# Patient Record
Sex: FEMALE | Race: WHITE | NOT HISPANIC OR LATINO | Employment: UNEMPLOYED | URBAN - NONMETROPOLITAN AREA
[De-identification: names, ages, dates, MRNs, and addresses within clinical notes are randomized per-mention and may not be internally consistent; named-entity substitution may affect disease eponyms.]

---

## 2017-11-28 ENCOUNTER — LAB (OUTPATIENT)
Dept: LAB | Facility: HOSPITAL | Age: 27
End: 2017-11-28
Attending: OBSTETRICS & GYNECOLOGY

## 2017-11-28 ENCOUNTER — TRANSCRIBE ORDERS (OUTPATIENT)
Dept: ADMINISTRATIVE | Facility: HOSPITAL | Age: 27
End: 2017-11-28

## 2017-11-28 DIAGNOSIS — Z34.82 PRENATAL CARE, SUBSEQUENT PREGNANCY, SECOND TRIMESTER: ICD-10-CM

## 2017-11-28 DIAGNOSIS — Z34.82 PRENATAL CARE, SUBSEQUENT PREGNANCY, SECOND TRIMESTER: Primary | ICD-10-CM

## 2017-11-28 LAB
EXTERNAL CHLAMYDIA SCREEN: NEGATIVE
EXTERNAL GONORRHEA SCREEN: NEGATIVE

## 2018-03-06 ENCOUNTER — LAB (OUTPATIENT)
Dept: LAB | Facility: HOSPITAL | Age: 28
End: 2018-03-06
Attending: OBSTETRICS & GYNECOLOGY

## 2018-03-06 ENCOUNTER — TRANSCRIBE ORDERS (OUTPATIENT)
Dept: ADMINISTRATIVE | Facility: HOSPITAL | Age: 28
End: 2018-03-06

## 2018-03-06 DIAGNOSIS — Z34.82 PRENATAL CARE, SUBSEQUENT PREGNANCY, SECOND TRIMESTER: ICD-10-CM

## 2018-03-06 DIAGNOSIS — Z34.82 PRENATAL CARE, SUBSEQUENT PREGNANCY, SECOND TRIMESTER: Primary | ICD-10-CM

## 2018-03-06 LAB
ABO GROUP BLD: NORMAL
ALBUMIN SERPL-MCNC: 4 G/DL (ref 3.5–5)
ALBUMIN/GLOB SERPL: 1.2 G/DL (ref 1.5–2.5)
ALP SERPL-CCNC: 121 U/L (ref 35–104)
ALT SERPL W P-5'-P-CCNC: 79 U/L (ref 10–36)
ANION GAP SERPL CALCULATED.3IONS-SCNC: 8 MMOL/L (ref 3.6–11.2)
AST SERPL-CCNC: 57 U/L (ref 10–30)
BASOPHILS # BLD AUTO: 0.02 10*3/MM3 (ref 0–0.3)
BASOPHILS NFR BLD AUTO: 0.2 % (ref 0–2)
BILIRUB SERPL-MCNC: 0.5 MG/DL (ref 0.2–1.8)
BLD GP AB SCN SERPL QL: NEGATIVE
BUN BLD-MCNC: 7 MG/DL (ref 7–21)
BUN/CREAT SERPL: 12.3 (ref 7–25)
CALCIUM SPEC-SCNC: 9.1 MG/DL (ref 7.7–10)
CHLORIDE SERPL-SCNC: 102 MMOL/L (ref 99–112)
CO2 SERPL-SCNC: 23 MMOL/L (ref 24.3–31.9)
CREAT BLD-MCNC: 0.57 MG/DL (ref 0.43–1.29)
DEPRECATED RDW RBC AUTO: 41 FL (ref 37–54)
EOSINOPHIL # BLD AUTO: 0.04 10*3/MM3 (ref 0–0.7)
EOSINOPHIL NFR BLD AUTO: 0.3 % (ref 0–5)
ERYTHROCYTE [DISTWIDTH] IN BLOOD BY AUTOMATED COUNT: 12.8 % (ref 11.5–14.5)
EXTERNAL RUBELLA QUALITATIVE: NORMAL
EXTERNAL SYPHILIS RPR SCREEN: NORMAL
EXTERNAL VDRL: NORMAL
GFR SERPL CREATININE-BSD FRML MDRD: 127 ML/MIN/1.73
GLOBULIN UR ELPH-MCNC: 3.3 GM/DL
GLUCOSE BLD-MCNC: 94 MG/DL (ref 70–110)
HBV SURFACE AG SERPL QL IA: NORMAL
HCT VFR BLD AUTO: 34.8 % (ref 37–47)
HCV AB SER DONR QL: REACTIVE
HGB BLD-MCNC: 11.4 G/DL (ref 12–16)
IMM GRANULOCYTES # BLD: 0.07 10*3/MM3 (ref 0–0.03)
IMM GRANULOCYTES NFR BLD: 0.6 % (ref 0–0.5)
LYMPHOCYTES # BLD AUTO: 1.06 10*3/MM3 (ref 1–3)
LYMPHOCYTES NFR BLD AUTO: 8.8 % (ref 21–51)
MCH RBC QN AUTO: 29.8 PG (ref 27–33)
MCHC RBC AUTO-ENTMCNC: 32.8 G/DL (ref 33–37)
MCV RBC AUTO: 91.1 FL (ref 80–94)
MONOCYTES # BLD AUTO: 0.77 10*3/MM3 (ref 0.1–0.9)
MONOCYTES NFR BLD AUTO: 6.4 % (ref 0–10)
NEUTROPHILS # BLD AUTO: 10.14 10*3/MM3 (ref 1.4–6.5)
NEUTROPHILS NFR BLD AUTO: 83.7 % (ref 30–70)
OSMOLALITY SERPL CALC.SUM OF ELEC: 264.1 MOSM/KG (ref 273–305)
PLATELET # BLD AUTO: 194 10*3/MM3 (ref 130–400)
PMV BLD AUTO: 9.4 FL (ref 6–10)
POTASSIUM BLD-SCNC: 3.9 MMOL/L (ref 3.5–5.3)
PROT SERPL-MCNC: 7.3 G/DL (ref 6–8)
RBC # BLD AUTO: 3.82 10*6/MM3 (ref 4.2–5.4)
RH BLD: POSITIVE
SODIUM BLD-SCNC: 133 MMOL/L (ref 135–153)
WBC NRBC COR # BLD: 12.1 10*3/MM3 (ref 4.5–12.5)

## 2018-03-06 PROCEDURE — 36415 COLL VENOUS BLD VENIPUNCTURE: CPT

## 2018-03-06 PROCEDURE — 86765 RUBEOLA ANTIBODY: CPT

## 2018-03-06 PROCEDURE — 86735 MUMPS ANTIBODY: CPT

## 2018-03-06 PROCEDURE — 80053 COMPREHEN METABOLIC PANEL: CPT

## 2018-03-06 PROCEDURE — 86702 HIV-2 ANTIBODY: CPT

## 2018-03-06 PROCEDURE — 86701 HIV-1ANTIBODY: CPT

## 2018-03-06 PROCEDURE — 86803 HEPATITIS C AB TEST: CPT

## 2018-03-08 LAB
HIV 1 & 2 AB SERPLBLD IA.RAPID: NEGATIVE
HIV 2 AB SERPLBLD QL IA.RAPID: NEGATIVE
HIV1 AB SERPLBLD QL IA.RAPID: NEGATIVE
MEV IGG SER IA-ACNC: >300 AU/ML
MUV IGG SER IA-ACNC: 65.1 AU/ML
RPR SER QL: NON REACTIVE
RUBV IGG SERPL IA-ACNC: 3.01 INDEX

## 2018-05-02 LAB — EXTERNAL GROUP B STREP ANTIGEN: NEGATIVE

## 2018-05-08 ENCOUNTER — PREP FOR SURGERY (OUTPATIENT)
Dept: OTHER | Facility: HOSPITAL | Age: 28
End: 2018-05-08

## 2018-05-08 RX ORDER — SODIUM CHLORIDE 0.9 % (FLUSH) 0.9 %
1-10 SYRINGE (ML) INJECTION AS NEEDED
Status: CANCELLED | OUTPATIENT
Start: 2018-05-08

## 2018-05-08 RX ORDER — DIPHENHYDRAMINE HCL 25 MG
25 CAPSULE ORAL NIGHTLY PRN
Status: CANCELLED | OUTPATIENT
Start: 2018-05-08

## 2018-05-08 RX ORDER — OXYTOCIN/RINGER'S LACTATE 20/1000 ML
2 PLASTIC BAG, INJECTION (ML) INTRAVENOUS CONTINUOUS
Status: CANCELLED | OUTPATIENT
Start: 2018-05-08

## 2018-05-08 RX ORDER — MISOPROSTOL 100 UG/1
600 TABLET ORAL AS NEEDED
Status: CANCELLED | OUTPATIENT
Start: 2018-05-08

## 2018-05-08 RX ORDER — METOCLOPRAMIDE HYDROCHLORIDE 5 MG/ML
10 INJECTION INTRAMUSCULAR; INTRAVENOUS EVERY 6 HOURS PRN
Status: CANCELLED | OUTPATIENT
Start: 2018-05-08

## 2018-05-08 RX ORDER — ONDANSETRON 4 MG/1
4 TABLET, ORALLY DISINTEGRATING ORAL EVERY 6 HOURS PRN
Status: CANCELLED | OUTPATIENT
Start: 2018-05-08

## 2018-05-08 RX ORDER — LIDOCAINE HYDROCHLORIDE 10 MG/ML
5 INJECTION, SOLUTION EPIDURAL; INFILTRATION; INTRACAUDAL; PERINEURAL AS NEEDED
Status: CANCELLED | OUTPATIENT
Start: 2018-05-08

## 2018-05-08 RX ORDER — ONDANSETRON 2 MG/ML
4 INJECTION INTRAMUSCULAR; INTRAVENOUS EVERY 6 HOURS PRN
Status: CANCELLED | OUTPATIENT
Start: 2018-05-08

## 2018-05-08 RX ORDER — PROMETHAZINE HYDROCHLORIDE 25 MG/ML
12.5 INJECTION, SOLUTION INTRAMUSCULAR; INTRAVENOUS EVERY 6 HOURS PRN
Status: CANCELLED | OUTPATIENT
Start: 2018-05-08

## 2018-05-08 RX ORDER — METHYLERGONOVINE MALEATE 0.2 MG/ML
200 INJECTION INTRAVENOUS AS NEEDED
Status: CANCELLED | OUTPATIENT
Start: 2018-05-08

## 2018-05-08 RX ORDER — DIPHENHYDRAMINE HYDROCHLORIDE 50 MG/ML
25 INJECTION INTRAMUSCULAR; INTRAVENOUS NIGHTLY PRN
Status: CANCELLED | OUTPATIENT
Start: 2018-05-08

## 2018-05-08 RX ORDER — PROMETHAZINE HYDROCHLORIDE 12.5 MG/1
12.5 TABLET ORAL EVERY 6 HOURS PRN
Status: CANCELLED | OUTPATIENT
Start: 2018-05-08

## 2018-05-08 RX ORDER — SODIUM CHLORIDE, SODIUM LACTATE, POTASSIUM CHLORIDE, CALCIUM CHLORIDE 600; 310; 30; 20 MG/100ML; MG/100ML; MG/100ML; MG/100ML
125 INJECTION, SOLUTION INTRAVENOUS CONTINUOUS
Status: CANCELLED | OUTPATIENT
Start: 2018-05-08

## 2018-05-08 RX ORDER — PROMETHAZINE HYDROCHLORIDE 12.5 MG/1
12.5 SUPPOSITORY RECTAL EVERY 6 HOURS PRN
Status: CANCELLED | OUTPATIENT
Start: 2018-05-08

## 2018-05-08 RX ORDER — ONDANSETRON 4 MG/1
4 TABLET, FILM COATED ORAL EVERY 6 HOURS PRN
Status: CANCELLED | OUTPATIENT
Start: 2018-05-08

## 2018-05-08 RX ORDER — CARBOPROST TROMETHAMINE 250 UG/ML
250 INJECTION, SOLUTION INTRAMUSCULAR AS NEEDED
Status: CANCELLED | OUTPATIENT
Start: 2018-05-08

## 2018-05-09 ENCOUNTER — ANESTHESIA EVENT (OUTPATIENT)
Dept: LABOR AND DELIVERY | Facility: HOSPITAL | Age: 28
End: 2018-05-09

## 2018-05-09 ENCOUNTER — HOSPITAL ENCOUNTER (INPATIENT)
Facility: HOSPITAL | Age: 28
LOS: 2 days | Discharge: HOME | End: 2018-05-11
Attending: OBSTETRICS & GYNECOLOGY | Admitting: OBSTETRICS & GYNECOLOGY

## 2018-05-09 ENCOUNTER — ANESTHESIA (OUTPATIENT)
Dept: LABOR AND DELIVERY | Facility: HOSPITAL | Age: 28
End: 2018-05-09

## 2018-05-09 PROBLEM — Z98.891 PREVIOUS CESAREAN SECTION: Status: ACTIVE | Noted: 2018-05-09

## 2018-05-09 PROBLEM — Z34.90 PREGNANCY: Status: ACTIVE | Noted: 2018-05-09

## 2018-05-09 LAB
6-ACETYL MORPHINE: NEGATIVE
ABO GROUP BLD: NORMAL
AMPHET+METHAMPHET UR QL: NEGATIVE
BARBITURATES UR QL SCN: NEGATIVE
BENZODIAZ UR QL SCN: NEGATIVE
BLD GP AB SCN SERPL QL: NEGATIVE
BUPRENORPHINE SERPL-MCNC: POSITIVE NG/ML
CANNABINOIDS SERPL QL: NEGATIVE
COCAINE UR QL: NEGATIVE
DEPRECATED RDW RBC AUTO: 40.5 FL (ref 37–54)
ERYTHROCYTE [DISTWIDTH] IN BLOOD BY AUTOMATED COUNT: 13.3 % (ref 11.5–14.5)
HCT VFR BLD AUTO: 33.1 % (ref 37–47)
HGB BLD-MCNC: 10.6 G/DL (ref 12–16)
MCH RBC QN AUTO: 27.6 PG (ref 27–33)
MCHC RBC AUTO-ENTMCNC: 32 G/DL (ref 33–37)
MCV RBC AUTO: 86.2 FL (ref 80–94)
METHADONE UR QL SCN: NEGATIVE
OPIATES UR QL: NEGATIVE
OXYCODONE UR QL SCN: NEGATIVE
PCP UR QL SCN: NEGATIVE
PLATELET # BLD AUTO: 162 10*3/MM3 (ref 130–400)
PMV BLD AUTO: 10.6 FL (ref 6–10)
RBC # BLD AUTO: 3.84 10*6/MM3 (ref 4.2–5.4)
RH BLD: POSITIVE
T&S EXPIRATION DATE: NORMAL
WBC NRBC COR # BLD: 10.71 10*3/MM3 (ref 4.5–12.5)

## 2018-05-09 PROCEDURE — 80307 DRUG TEST PRSMV CHEM ANLYZR: CPT | Performed by: OBSTETRICS & GYNECOLOGY

## 2018-05-09 PROCEDURE — 59025 FETAL NON-STRESS TEST: CPT

## 2018-05-09 PROCEDURE — 86901 BLOOD TYPING SEROLOGIC RH(D): CPT | Performed by: OBSTETRICS & GYNECOLOGY

## 2018-05-09 PROCEDURE — 86850 RBC ANTIBODY SCREEN: CPT | Performed by: OBSTETRICS & GYNECOLOGY

## 2018-05-09 PROCEDURE — 25010000002 KETOROLAC TROMETHAMINE PER 15 MG: Performed by: OBSTETRICS & GYNECOLOGY

## 2018-05-09 PROCEDURE — 88305 TISSUE EXAM BY PATHOLOGIST: CPT | Performed by: OBSTETRICS & GYNECOLOGY

## 2018-05-09 PROCEDURE — 25010000003 CEFAZOLIN PER 500 MG: Performed by: NURSE ANESTHETIST, CERTIFIED REGISTERED

## 2018-05-09 PROCEDURE — 25010000002 FENTANYL CITRATE (PF) 100 MCG/2ML SOLUTION: Performed by: NURSE ANESTHETIST, CERTIFIED REGISTERED

## 2018-05-09 PROCEDURE — 85027 COMPLETE CBC AUTOMATED: CPT | Performed by: OBSTETRICS & GYNECOLOGY

## 2018-05-09 PROCEDURE — 86900 BLOOD TYPING SEROLOGIC ABO: CPT | Performed by: OBSTETRICS & GYNECOLOGY

## 2018-05-09 PROCEDURE — 94799 UNLISTED PULMONARY SVC/PX: CPT

## 2018-05-09 PROCEDURE — C1751 CATH, INF, PER/CENT/MIDLINE: HCPCS

## 2018-05-09 PROCEDURE — 25010000003 MORPHINE PER 10 MG: Performed by: NURSE ANESTHETIST, CERTIFIED REGISTERED

## 2018-05-09 PROCEDURE — 0UL70CZ OCCLUSION OF BILATERAL FALLOPIAN TUBES WITH EXTRALUMINAL DEVICE, OPEN APPROACH: ICD-10-PCS | Performed by: OBSTETRICS & GYNECOLOGY

## 2018-05-09 DEVICE — CLIP FALLOP FILSHIE TI PR STRL BX/20: Type: IMPLANTABLE DEVICE | Site: ABDOMEN | Status: FUNCTIONAL

## 2018-05-09 RX ORDER — SIMETHICONE 80 MG
80 TABLET,CHEWABLE ORAL 4 TIMES DAILY
Status: DISCONTINUED | OUTPATIENT
Start: 2018-05-09 | End: 2018-05-11 | Stop reason: HOSPADM

## 2018-05-09 RX ORDER — SODIUM CHLORIDE, SODIUM LACTATE, POTASSIUM CHLORIDE, CALCIUM CHLORIDE 600; 310; 30; 20 MG/100ML; MG/100ML; MG/100ML; MG/100ML
125 INJECTION, SOLUTION INTRAVENOUS CONTINUOUS
Status: DISCONTINUED | OUTPATIENT
Start: 2018-05-09 | End: 2018-05-09

## 2018-05-09 RX ORDER — DIPHENHYDRAMINE HCL 25 MG
25 CAPSULE ORAL NIGHTLY PRN
Status: DISCONTINUED | OUTPATIENT
Start: 2018-05-09 | End: 2018-05-09 | Stop reason: HOSPADM

## 2018-05-09 RX ORDER — OXYCODONE HYDROCHLORIDE AND ACETAMINOPHEN 5; 325 MG/1; MG/1
1 TABLET ORAL EVERY 4 HOURS PRN
Status: DISCONTINUED | OUTPATIENT
Start: 2018-05-09 | End: 2018-05-11 | Stop reason: HOSPADM

## 2018-05-09 RX ORDER — METOCLOPRAMIDE HYDROCHLORIDE 5 MG/ML
10 INJECTION INTRAMUSCULAR; INTRAVENOUS EVERY 6 HOURS PRN
Status: DISCONTINUED | OUTPATIENT
Start: 2018-05-09 | End: 2018-05-09 | Stop reason: HOSPADM

## 2018-05-09 RX ORDER — ONDANSETRON 2 MG/ML
4 INJECTION INTRAMUSCULAR; INTRAVENOUS ONCE AS NEEDED
Status: DISCONTINUED | OUTPATIENT
Start: 2018-05-09 | End: 2018-05-09 | Stop reason: HOSPADM

## 2018-05-09 RX ORDER — OXYTOCIN/RINGER'S LACTATE 20/1000 ML
125 PLASTIC BAG, INJECTION (ML) INTRAVENOUS CONTINUOUS
Status: DISCONTINUED | OUTPATIENT
Start: 2018-05-09 | End: 2018-05-09

## 2018-05-09 RX ORDER — MORPHINE SULFATE 0.5 MG/ML
INJECTION, SOLUTION EPIDURAL; INTRATHECAL; INTRAVENOUS AS NEEDED
Status: DISCONTINUED | OUTPATIENT
Start: 2018-05-09 | End: 2018-05-09 | Stop reason: SURG

## 2018-05-09 RX ORDER — KETOROLAC TROMETHAMINE 30 MG/ML
30 INJECTION, SOLUTION INTRAMUSCULAR; INTRAVENOUS EVERY 6 HOURS PRN
Status: DISCONTINUED | OUTPATIENT
Start: 2018-05-09 | End: 2018-05-11 | Stop reason: HOSPADM

## 2018-05-09 RX ORDER — MISOPROSTOL 100 UG/1
800 TABLET ORAL AS NEEDED
Status: DISCONTINUED | OUTPATIENT
Start: 2018-05-09 | End: 2018-05-09 | Stop reason: HOSPADM

## 2018-05-09 RX ORDER — CARBOPROST TROMETHAMINE 250 UG/ML
250 INJECTION, SOLUTION INTRAMUSCULAR AS NEEDED
Status: DISCONTINUED | OUTPATIENT
Start: 2018-05-09 | End: 2018-05-09 | Stop reason: HOSPADM

## 2018-05-09 RX ORDER — BUPRENORPHINE 2 MG/1
8 TABLET SUBLINGUAL 2 TIMES DAILY
Status: CANCELLED | OUTPATIENT
Start: 2018-05-09

## 2018-05-09 RX ORDER — ONDANSETRON 4 MG/1
4 TABLET, FILM COATED ORAL EVERY 6 HOURS PRN
Status: DISCONTINUED | OUTPATIENT
Start: 2018-05-09 | End: 2018-05-09 | Stop reason: HOSPADM

## 2018-05-09 RX ORDER — ONDANSETRON 2 MG/ML
4 INJECTION INTRAMUSCULAR; INTRAVENOUS EVERY 6 HOURS PRN
Status: DISCONTINUED | OUTPATIENT
Start: 2018-05-09 | End: 2018-05-09 | Stop reason: HOSPADM

## 2018-05-09 RX ORDER — FERROUS SULFATE 325(65) MG
325 TABLET ORAL
Status: DISCONTINUED | OUTPATIENT
Start: 2018-05-10 | End: 2018-05-11 | Stop reason: HOSPADM

## 2018-05-09 RX ORDER — LIDOCAINE HYDROCHLORIDE 10 MG/ML
5 INJECTION, SOLUTION EPIDURAL; INFILTRATION; INTRACAUDAL; PERINEURAL AS NEEDED
Status: DISCONTINUED | OUTPATIENT
Start: 2018-05-09 | End: 2018-05-09 | Stop reason: HOSPADM

## 2018-05-09 RX ORDER — ONDANSETRON 2 MG/ML
4 INJECTION INTRAMUSCULAR; INTRAVENOUS EVERY 6 HOURS PRN
Status: DISCONTINUED | OUTPATIENT
Start: 2018-05-09 | End: 2018-05-11 | Stop reason: HOSPADM

## 2018-05-09 RX ORDER — LANOLIN 100 %
OINTMENT (GRAM) TOPICAL
Status: DISCONTINUED | OUTPATIENT
Start: 2018-05-09 | End: 2018-05-11 | Stop reason: HOSPADM

## 2018-05-09 RX ORDER — ACETAMINOPHEN 325 MG/1
650 TABLET ORAL EVERY 4 HOURS PRN
Status: DISCONTINUED | OUTPATIENT
Start: 2018-05-09 | End: 2018-05-11 | Stop reason: HOSPADM

## 2018-05-09 RX ORDER — PROMETHAZINE HYDROCHLORIDE 25 MG/ML
12.5 INJECTION, SOLUTION INTRAMUSCULAR; INTRAVENOUS EVERY 6 HOURS PRN
Status: DISCONTINUED | OUTPATIENT
Start: 2018-05-09 | End: 2018-05-09 | Stop reason: HOSPADM

## 2018-05-09 RX ORDER — METOCLOPRAMIDE 10 MG/1
10 TABLET ORAL ONCE
Status: DISCONTINUED | OUTPATIENT
Start: 2018-05-09 | End: 2018-05-11 | Stop reason: HOSPADM

## 2018-05-09 RX ORDER — PRENATAL VIT/IRON FUM/FOLIC AC 27MG-0.8MG
1 TABLET ORAL DAILY
COMMUNITY

## 2018-05-09 RX ORDER — BUPRENORPHINE HYDROCHLORIDE 8 MG/1
12 TABLET SUBLINGUAL DAILY
COMMUNITY

## 2018-05-09 RX ORDER — DIPHENHYDRAMINE HYDROCHLORIDE 50 MG/ML
25 INJECTION INTRAMUSCULAR; INTRAVENOUS NIGHTLY PRN
Status: DISCONTINUED | OUTPATIENT
Start: 2018-05-09 | End: 2018-05-09

## 2018-05-09 RX ORDER — OXYTOCIN 10 [USP'U]/ML
INJECTION, SOLUTION INTRAMUSCULAR; INTRAVENOUS AS NEEDED
Status: DISCONTINUED | OUTPATIENT
Start: 2018-05-09 | End: 2018-05-09 | Stop reason: SURG

## 2018-05-09 RX ORDER — OXYTOCIN/RINGER'S LACTATE 20/1000 ML
2 PLASTIC BAG, INJECTION (ML) INTRAVENOUS CONTINUOUS
Status: DISCONTINUED | OUTPATIENT
Start: 2018-05-09 | End: 2018-05-09

## 2018-05-09 RX ORDER — RANITIDINE 150 MG/1
150 TABLET ORAL 2 TIMES DAILY
COMMUNITY

## 2018-05-09 RX ORDER — MAGNESIUM HYDROXIDE 1200 MG/15ML
2000 LIQUID ORAL DAILY PRN
Status: DISCONTINUED | OUTPATIENT
Start: 2018-05-09 | End: 2018-05-11 | Stop reason: HOSPADM

## 2018-05-09 RX ORDER — MAGNESIUM HYDROXIDE 1200 MG/15ML
LIQUID ORAL
Status: COMPLETED
Start: 2018-05-09 | End: 2018-05-09

## 2018-05-09 RX ORDER — ONDANSETRON 4 MG/1
4 TABLET, ORALLY DISINTEGRATING ORAL EVERY 6 HOURS PRN
Status: DISCONTINUED | OUTPATIENT
Start: 2018-05-09 | End: 2018-05-11 | Stop reason: HOSPADM

## 2018-05-09 RX ORDER — SODIUM CHLORIDE 0.9 % (FLUSH) 0.9 %
1-10 SYRINGE (ML) INJECTION AS NEEDED
Status: DISCONTINUED | OUTPATIENT
Start: 2018-05-09 | End: 2018-05-09

## 2018-05-09 RX ORDER — SODIUM CHLORIDE, SODIUM LACTATE, POTASSIUM CHLORIDE, CALCIUM CHLORIDE 600; 310; 30; 20 MG/100ML; MG/100ML; MG/100ML; MG/100ML
125 INJECTION, SOLUTION INTRAVENOUS CONTINUOUS
Status: DISCONTINUED | OUTPATIENT
Start: 2018-05-09 | End: 2018-05-11

## 2018-05-09 RX ORDER — EPHEDRINE SULFATE 50 MG/ML
10 INJECTION, SOLUTION INTRAVENOUS
Status: DISCONTINUED | OUTPATIENT
Start: 2018-05-09 | End: 2018-05-09 | Stop reason: HOSPADM

## 2018-05-09 RX ORDER — OXYCODONE AND ACETAMINOPHEN 10; 325 MG/1; MG/1
1 TABLET ORAL EVERY 4 HOURS PRN
Status: DISCONTINUED | OUTPATIENT
Start: 2018-05-09 | End: 2018-05-11 | Stop reason: HOSPADM

## 2018-05-09 RX ORDER — ROPIVACAINE HYDROCHLORIDE 2 MG/ML
14 INJECTION, SOLUTION EPIDURAL; INFILTRATION; PERINEURAL CONTINUOUS
Status: DISCONTINUED | OUTPATIENT
Start: 2018-05-09 | End: 2018-05-09

## 2018-05-09 RX ORDER — BUPIVACAINE HYDROCHLORIDE 5 MG/ML
INJECTION, SOLUTION EPIDURAL; INTRACAUDAL AS NEEDED
Status: DISCONTINUED | OUTPATIENT
Start: 2018-05-09 | End: 2018-05-09 | Stop reason: SURG

## 2018-05-09 RX ORDER — IBUPROFEN 800 MG/1
800 TABLET ORAL 3 TIMES DAILY
Status: DISCONTINUED | OUTPATIENT
Start: 2018-05-09 | End: 2018-05-11 | Stop reason: HOSPADM

## 2018-05-09 RX ORDER — FAMOTIDINE 10 MG/ML
20 INJECTION, SOLUTION INTRAVENOUS ONCE AS NEEDED
Status: DISCONTINUED | OUTPATIENT
Start: 2018-05-09 | End: 2018-05-09 | Stop reason: HOSPADM

## 2018-05-09 RX ORDER — BISACODYL 10 MG
10 SUPPOSITORY, RECTAL RECTAL DAILY PRN
Status: DISCONTINUED | OUTPATIENT
Start: 2018-05-09 | End: 2018-05-11 | Stop reason: HOSPADM

## 2018-05-09 RX ORDER — MAGNESIUM HYDROXIDE 1200 MG/15ML
LIQUID ORAL AS NEEDED
Status: DISCONTINUED | OUTPATIENT
Start: 2018-05-09 | End: 2018-05-11 | Stop reason: HOSPADM

## 2018-05-09 RX ORDER — CARBOPROST TROMETHAMINE 250 UG/ML
250 INJECTION, SOLUTION INTRAMUSCULAR AS NEEDED
Status: DISCONTINUED | OUTPATIENT
Start: 2018-05-09 | End: 2018-05-09

## 2018-05-09 RX ORDER — BUPRENORPHINE 2 MG/1
12 TABLET SUBLINGUAL NIGHTLY
Status: DISCONTINUED | OUTPATIENT
Start: 2018-05-09 | End: 2018-05-11 | Stop reason: HOSPADM

## 2018-05-09 RX ORDER — METHYLERGONOVINE MALEATE 0.2 MG/ML
200 INJECTION INTRAVENOUS ONCE AS NEEDED
Status: DISCONTINUED | OUTPATIENT
Start: 2018-05-09 | End: 2018-05-09

## 2018-05-09 RX ORDER — METHYLERGONOVINE MALEATE 0.2 MG/ML
200 INJECTION INTRAVENOUS AS NEEDED
Status: DISCONTINUED | OUTPATIENT
Start: 2018-05-09 | End: 2018-05-09 | Stop reason: HOSPADM

## 2018-05-09 RX ORDER — PROMETHAZINE HYDROCHLORIDE 12.5 MG/1
12.5 SUPPOSITORY RECTAL EVERY 6 HOURS PRN
Status: DISCONTINUED | OUTPATIENT
Start: 2018-05-09 | End: 2018-05-09 | Stop reason: HOSPADM

## 2018-05-09 RX ORDER — FERROUS SULFATE TAB EC 324 MG (65 MG FE EQUIVALENT) 324 (65 FE) MG
324 TABLET DELAYED RESPONSE ORAL
COMMUNITY

## 2018-05-09 RX ORDER — HYDROXYZINE 50 MG/1
50 TABLET, FILM COATED ORAL EVERY 6 HOURS PRN
Status: DISCONTINUED | OUTPATIENT
Start: 2018-05-09 | End: 2018-05-11 | Stop reason: HOSPADM

## 2018-05-09 RX ORDER — PRENATAL VIT/IRON FUM/FOLIC AC 27MG-0.8MG
1 TABLET ORAL DAILY
Status: DISCONTINUED | OUTPATIENT
Start: 2018-05-10 | End: 2018-05-11 | Stop reason: HOSPADM

## 2018-05-09 RX ORDER — OXYTOCIN/RINGER'S LACTATE 20/1000 ML
125 PLASTIC BAG, INJECTION (ML) INTRAVENOUS CONTINUOUS
Status: DISCONTINUED | OUTPATIENT
Start: 2018-05-09 | End: 2018-05-11

## 2018-05-09 RX ORDER — LIDOCAINE HYDROCHLORIDE 10 MG/ML
5 INJECTION, SOLUTION EPIDURAL; INFILTRATION; INTRACAUDAL; PERINEURAL AS NEEDED
Status: DISCONTINUED | OUTPATIENT
Start: 2018-05-09 | End: 2018-05-09

## 2018-05-09 RX ORDER — ONDANSETRON 4 MG/1
4 TABLET, ORALLY DISINTEGRATING ORAL EVERY 6 HOURS PRN
Status: DISCONTINUED | OUTPATIENT
Start: 2018-05-09 | End: 2018-05-09 | Stop reason: HOSPADM

## 2018-05-09 RX ORDER — FAMOTIDINE 20 MG/1
20 TABLET, FILM COATED ORAL 2 TIMES DAILY
Status: DISCONTINUED | OUTPATIENT
Start: 2018-05-09 | End: 2018-05-11 | Stop reason: HOSPADM

## 2018-05-09 RX ORDER — PROMETHAZINE HYDROCHLORIDE 25 MG/1
12.5 TABLET ORAL EVERY 6 HOURS PRN
Status: DISCONTINUED | OUTPATIENT
Start: 2018-05-09 | End: 2018-05-09 | Stop reason: HOSPADM

## 2018-05-09 RX ORDER — LIDOCAINE HYDROCHLORIDE 10 MG/ML
10 INJECTION, SOLUTION INFILTRATION; PERINEURAL ONCE
Status: DISCONTINUED | OUTPATIENT
Start: 2018-05-09 | End: 2018-05-09

## 2018-05-09 RX ORDER — FERROUS SULFATE 325(65) MG
325 TABLET ORAL
Status: CANCELLED | OUTPATIENT
Start: 2018-05-09

## 2018-05-09 RX ORDER — DOCUSATE SODIUM 100 MG/1
100 CAPSULE, LIQUID FILLED ORAL DAILY
Status: DISCONTINUED | OUTPATIENT
Start: 2018-05-09 | End: 2018-05-11 | Stop reason: HOSPADM

## 2018-05-09 RX ORDER — SODIUM CHLORIDE 0.9 % (FLUSH) 0.9 %
10 SYRINGE (ML) INJECTION AS NEEDED
Status: DISCONTINUED | OUTPATIENT
Start: 2018-05-09 | End: 2018-05-11 | Stop reason: HOSPADM

## 2018-05-09 RX ORDER — FAMOTIDINE 20 MG/1
20 TABLET, FILM COATED ORAL 2 TIMES DAILY
Status: CANCELLED | OUTPATIENT
Start: 2018-05-09

## 2018-05-09 RX ORDER — FENTANYL CITRATE 50 UG/ML
INJECTION, SOLUTION INTRAMUSCULAR; INTRAVENOUS AS NEEDED
Status: DISCONTINUED | OUTPATIENT
Start: 2018-05-09 | End: 2018-05-09 | Stop reason: SURG

## 2018-05-09 RX ORDER — ONDANSETRON 4 MG/1
4 TABLET, FILM COATED ORAL EVERY 6 HOURS PRN
Status: DISCONTINUED | OUTPATIENT
Start: 2018-05-09 | End: 2018-05-11 | Stop reason: HOSPADM

## 2018-05-09 RX ORDER — MISOPROSTOL 100 UG/1
600 TABLET ORAL ONCE
Status: DISCONTINUED | OUTPATIENT
Start: 2018-05-09 | End: 2018-05-09

## 2018-05-09 RX ORDER — MISOPROSTOL 100 UG/1
600 TABLET ORAL AS NEEDED
Status: DISCONTINUED | OUTPATIENT
Start: 2018-05-09 | End: 2018-05-09 | Stop reason: HOSPADM

## 2018-05-09 RX ORDER — SODIUM CHLORIDE 0.9 % (FLUSH) 0.9 %
10 SYRINGE (ML) INJECTION EVERY 12 HOURS SCHEDULED
Status: DISCONTINUED | OUTPATIENT
Start: 2018-05-09 | End: 2018-05-11 | Stop reason: HOSPADM

## 2018-05-09 RX ORDER — PRENATAL VIT/IRON FUM/FOLIC AC 27MG-0.8MG
1 TABLET ORAL DAILY
Status: CANCELLED | OUTPATIENT
Start: 2018-05-09

## 2018-05-09 RX ORDER — OXYTOCIN/RINGER'S LACTATE 20/1000 ML
PLASTIC BAG, INJECTION (ML) INTRAVENOUS
Status: DISCONTINUED
Start: 2018-05-09 | End: 2018-05-11 | Stop reason: HOSPADM

## 2018-05-09 RX ADMIN — FAMOTIDINE 20 MG: 20 TABLET, FILM COATED ORAL at 21:18

## 2018-05-09 RX ADMIN — SODIUM CHLORIDE, POTASSIUM CHLORIDE, SODIUM LACTATE AND CALCIUM CHLORIDE 125 ML/HR: 600; 310; 30; 20 INJECTION, SOLUTION INTRAVENOUS at 17:30

## 2018-05-09 RX ADMIN — BUPRENORPHINE HCL 12 MG: 2 TABLET SUBLINGUAL at 21:30

## 2018-05-09 RX ADMIN — OXYTOCIN 125 ML/HR: 10 INJECTION INTRAVENOUS at 11:28

## 2018-05-09 RX ADMIN — OXYTOCIN 20 UNITS: 10 INJECTION, SOLUTION INTRAMUSCULAR; INTRAVENOUS at 09:36

## 2018-05-09 RX ADMIN — MORPHINE SULFATE 0.4 MG: 0.5 INJECTION EPIDURAL; INTRATHECAL; INTRAVENOUS at 08:59

## 2018-05-09 RX ADMIN — SIMETHICONE CHEW TAB 80 MG 80 MG: 80 TABLET ORAL at 23:31

## 2018-05-09 RX ADMIN — MAGNESIUM HYDROXIDE 2000 ML: 1200 LIQUID ORAL at 06:49

## 2018-05-09 RX ADMIN — ONDANSETRON 4 MG: 4 TABLET, FILM COATED ORAL at 13:51

## 2018-05-09 RX ADMIN — BUPIVACAINE HYDROCHLORIDE 2.5 ML: 5 INJECTION, SOLUTION EPIDURAL; INTRACAUDAL; PERINEURAL at 08:59

## 2018-05-09 RX ADMIN — IBUPROFEN 800 MG: 800 TABLET ORAL at 21:17

## 2018-05-09 RX ADMIN — KETOROLAC TROMETHAMINE 30 MG: 30 INJECTION, SOLUTION INTRAMUSCULAR; INTRAVENOUS at 11:49

## 2018-05-09 RX ADMIN — FENTANYL CITRATE 25 MCG: 50 INJECTION, SOLUTION INTRAMUSCULAR; INTRAVENOUS at 08:59

## 2018-05-09 RX ADMIN — OXYCODONE HYDROCHLORIDE AND ACETAMINOPHEN 1 TABLET: 10; 325 TABLET ORAL at 13:51

## 2018-05-09 RX ADMIN — MISOPROSTOL 600 MCG: 100 TABLET ORAL at 09:35

## 2018-05-09 RX ADMIN — SODIUM CHLORIDE, POTASSIUM CHLORIDE, SODIUM LACTATE AND CALCIUM CHLORIDE 1000 ML: 600; 310; 30; 20 INJECTION, SOLUTION INTRAVENOUS at 08:30

## 2018-05-09 RX ADMIN — SIMETHICONE CHEW TAB 80 MG 80 MG: 80 TABLET ORAL at 17:30

## 2018-05-09 RX ADMIN — OXYCODONE HYDROCHLORIDE AND ACETAMINOPHEN 1 TABLET: 10; 325 TABLET ORAL at 17:30

## 2018-05-09 RX ADMIN — SODIUM CHLORIDE 2000 ML: 900 IRRIGANT IRRIGATION at 06:49

## 2018-05-09 RX ADMIN — MORPHINE SULFATE 4.6 MG: 0.5 INJECTION EPIDURAL; INTRATHECAL; INTRAVENOUS at 09:24

## 2018-05-09 RX ADMIN — SODIUM CHLORIDE, POTASSIUM CHLORIDE, SODIUM LACTATE AND CALCIUM CHLORIDE: 600; 310; 30; 20 INJECTION, SOLUTION INTRAVENOUS at 09:36

## 2018-05-09 RX ADMIN — OXYCODONE HYDROCHLORIDE AND ACETAMINOPHEN 1 TABLET: 10; 325 TABLET ORAL at 21:18

## 2018-05-09 RX ADMIN — OXYTOCIN 20 UNITS: 10 INJECTION, SOLUTION INTRAMUSCULAR; INTRAVENOUS at 09:16

## 2018-05-09 RX ADMIN — CEFAZOLIN SODIUM 2 G: 2 SOLUTION INTRAVENOUS at 08:53

## 2018-05-09 RX ADMIN — FENTANYL CITRATE 75 MCG: 50 INJECTION, SOLUTION INTRAMUSCULAR; INTRAVENOUS at 09:24

## 2018-05-09 RX ADMIN — SODIUM CHLORIDE, POTASSIUM CHLORIDE, SODIUM LACTATE AND CALCIUM CHLORIDE: 600; 310; 30; 20 INJECTION, SOLUTION INTRAVENOUS at 08:53

## 2018-05-09 NOTE — ANESTHESIA PREPROCEDURE EVALUATION
Anesthesia Evaluation     Patient summary reviewed and Nursing notes reviewed   NPO Solid Status: > 8 hours  NPO Liquid Status: > 8 hours           Airway   Mallampati: II  TM distance: >3 FB  Neck ROM: full  no difficulty expected  Dental    (+) poor dentition    Pulmonary - negative pulmonary ROS   Cardiovascular - negative cardio ROS    Rhythm: regular  Rate: normal        Neuro/Psych- negative ROS  GI/Hepatic/Renal/Endo    (+)   liver disease,     Musculoskeletal     (+) chronic pain,   Abdominal    Substance History - negative use     OB/GYN    (+) Pregnant,         Other - negative ROS                       Anesthesia Plan    ASA 3     general     intravenous induction   Anesthetic plan and risks discussed with patient.  Use of blood products discussed with patient .   Plan discussed with CRNA.

## 2018-05-09 NOTE — PLAN OF CARE
Problem: Patient Care Overview  Goal: Plan of Care Review  Outcome: Ongoing (interventions implemented as appropriate)   18 0932   Coping/Psychosocial   Plan of Care Reviewed With patient   Plan of Care Review   Progress improving   OTHER   Outcome Summary s/p c/s with btl     Goal: Individualization and Mutuality  Outcome: Ongoing (interventions implemented as appropriate)    Goal: Discharge Needs Assessment  Outcome: Ongoing (interventions implemented as appropriate)      Problem:  Delivery (Adult,Obstetrics,Pediatric)  Goal: Signs and Symptoms of Listed Potential Problems Will be Absent, Minimized or Managed ( Delivery)  Outcome: Outcome(s) achieved Date Met: 18

## 2018-05-09 NOTE — NON STRESS TEST
Pamela Calero, a  at 39w1d with an LORIE of 5/15/2018, by Ultrasound, was seen at [facility] LABOR DELIVERY for a nonstress test.    Chief Complaint   Patient presents with   • Repeat  Section with Bilateral Tubal Ligation       Interpretation A  Nonstress Test Interpretation A: Reactive (18 : Eden Krause, RN)  Comments A: Verified by Rhonda Hinton RN (18 : Eden Krause, JAZ)

## 2018-05-09 NOTE — PAYOR COMM NOTE
"CONTACT:  THUY ALVARENGA RN, BSN  UTILIZATION MANAGEMENT DEPT.  [facility]  [address], [address]  PHONE:  [phone]  FAX: [phone]      REQUEST FOR INPATIENT DELIVERY AUTHORIZATION. PATIENT PRESENTED FOR SCHEDULE .    Logan Calero (28 y.o. Female)     Date of Birth Social Security Number Address Home Phone MRN    1990  [address] [address] [phone] [id number]    Scientologist Marital Status          None        Admission Date Admission Type Admitting Provider Attending Provider Department, Room/Bed    18 Elective Smooth Smith DO Gilbert, Travis Daniel, DO [facility], W240    Discharge Date Discharge Disposition Discharge Destination                       Attending Provider:  Smooth Smith DO    Allergies:  No Known Allergies    Isolation:  None   Infection:  None   Code Status:  FULL    Ht:  160 cm (63\")   Wt:  76.4 kg (168 lb 8 oz)    Admission Cmt:  None   Principal Problem:  None                Active Insurance as of 2018     Primary Coverage     Payor Plan Insurance Group Employer/Plan Group    PASSPORT PASSPORT MEDICAID     Payor Plan Address Payor Plan Phone Number Effective From Effective To    [address] [phone] [address]     [place] [address]       Subscriber Name Subscriber Birth Date Member ID       LOGAN CALERO 1990 [id number]                 Emergency Contacts      (Rel.) Home Phone Work Phone Mobile Phone    Ottoniel Calero (Spouse) [phone] -- --        DELIVERY INFORMATION:    ADMIT DATE: 2018    DELIVERY DATE AND TIME: 2018 @ 0915    DELIVERY TYPE:     GENDER OF BABY: FEMALE    WEIGHT: 3190 GRAMS    APGARS:     NURSERY TYPE: REGULAR    GESTATIONAL AGE: 39/1    EDC: 5/15/2018    /PARA: 5/3       History & Physical      H&P filed by Emily Clarke MD at 2018  8:16 AM     Scan on 2018 : " [facility] 2018 (below)            Electronically signed by Interface, Scans Incoming at 2018  8:16 AM          Operative/Procedure Notes (all)      Smooth Smith, DO at 2018  9:12 AM  Version 1 of 1          [place]   Section Operative Note    Pre-Operative Dx:   1.  Term intrauterine pregnancy  2.  Previous  section    3.  Desire for permanent sterilization       Postoperative dx:    1.  Same     Procedure: Procedure(s):   SECTION REPEAT WITH TUBAL   Surgeon: Smooth Smith         Anesthesia: Spinal    EBL:    mls.  minimal mls.                  I/O this shift:  In: 1000 [I.V.:1000]  Out: -    Antibiotics: cefazolin (Ancef)     Infant:      Name:            Gender: female  infant    Weight: No birth weight on file.     Apgars:    @ 1 minute /        @ 5 minutes                       Procedure Details:  Informed consent was obtained after answering all the patients questions.  We discussed the failure rate of bilateral tubal ligation and quoted a failure rate of 4-1000.    The patient was taken to the operating room given regional anesthesia, prepped and draped in the usual sterile fashion. A  pfannenstiel incision was made with a scalpel and carried down sharply to the underlying layer of fascia with a scalpel. The fascia was incised in the midline and  the incision was extended laterally using curved Omalley scissors.superior aspect of the incision was grass with Kocher clamps and the underlying rectus muscles were dissected off with sharp and blunt dissection. Inferiorly, this was done in the exact same manner. The rectus muscles were  in the midline and the peritoneal incision was entered bluntly. The peritoneal incision was extended superiorly and inferiorly with good visualization of the bladder. We inserted a bladder blade, and a bladder flap was created. The lower uterine segment was incised in a transverse fashion and the incision was  extended laterally with blunt dissection. The baby was then delivered in an atraumatic fashion. The mouth and nose were suctioned and the infant was vigorous and gave a good cry. The baby was handed off to the waiting nursery staff. Next the placenta was delivered spontaneously.  The uterus was exteriorized and cleared of all clots and debris. The uterine incision was closed with two layers of 0 Monocryl. The second layer was an imbricating Lembert type stitch.    Next, a bilateral tubal ligation was performed.  We isolated the fallopian tubes bilaterally and followed them out to the fimbria.  We placed a filshie clip in the midportion of the tube bilaterally until good blanching was noted.     The uterus was then returned back to the peritoneal cavity and the gutters were cleared of all clots and debris. We then closed the peritoneal incision using 3-0 Vicryl in a running fashion. The muscles were then closed with an interrupted figure of eight stitch. We irrigated on top of the muscles and  assured hemostasis. The rectus fascia was then closed with a running stitch of 0 Vicryl. We irrigated the subcutaneous fatty tissue and closed it with a 3-0 Monicryl. The skin was closed with a 4-0 Vicryl in a subcuticular fashion and surgical adhesive was placed over the incision.  Sponge lap and needle counts were correct.          Complications:   none   Grafts or Implants: NA   Disposition:   Mother to recovery room in stable condition          Smooth Smith DO  5/9/2018  10:21 AM      Electronically signed by Smooth Smith DO at 5/9/2018 10:22 AM       Physician Progress Notes (all)     No notes of this type exist for this encounter.

## 2018-05-09 NOTE — OP NOTE
Steve   Section Operative Note    Pre-Operative Dx:   1.  Term intrauterine pregnancy  2.  Previous  section    3.  Desire for permanent sterilization       Postoperative dx:    1.  Same     Procedure: Procedure(s):   SECTION REPEAT WITH TUBAL   Surgeon: Smooth Smith         Anesthesia: Spinal    EBL:    mls.  minimal mls.                  I/O this shift:  In: 1000 [I.V.:1000]  Out: -    Antibiotics: cefazolin (Ancef)     Infant:      Name:            Gender: female  infant    Weight: No birth weight on file.     Apgars:    @ 1 minute /        @ 5 minutes                       Procedure Details:  Informed consent was obtained after answering all the patients questions.  We discussed the failure rate of bilateral tubal ligation and quoted a failure rate of 4-1000.    The patient was taken to the operating room given regional anesthesia, prepped and draped in the usual sterile fashion. A  pfannenstiel incision was made with a scalpel and carried down sharply to the underlying layer of fascia with a scalpel. The fascia was incised in the midline and  the incision was extended laterally using curved Omalley scissors.superior aspect of the incision was grass with Kocher clamps and the underlying rectus muscles were dissected off with sharp and blunt dissection. Inferiorly, this was done in the exact same manner. The rectus muscles were  in the midline and the peritoneal incision was entered bluntly. The peritoneal incision was extended superiorly and inferiorly with good visualization of the bladder. We inserted a bladder blade, and a bladder flap was created. The lower uterine segment was incised in a transverse fashion and the incision was extended laterally with blunt dissection. The baby was then delivered in an atraumatic fashion. The mouth and nose were suctioned and the infant was vigorous and gave a good cry. The baby was handed off to the waiting nursery staff. Next  the placenta was delivered spontaneously.  The uterus was exteriorized and cleared of all clots and debris. The uterine incision was closed with two layers of 0 Monocryl. The second layer was an imbricating Lembert type stitch.    Next, a bilateral tubal ligation was performed.  We isolated the fallopian tubes bilaterally and followed them out to the fimbria.  We placed a filshie clip in the midportion of the tube bilaterally until good blanching was noted.     The uterus was then returned back to the peritoneal cavity and the gutters were cleared of all clots and debris. We then closed the peritoneal incision using 3-0 Vicryl in a running fashion. The muscles were then closed with an interrupted figure of eight stitch. We irrigated on top of the muscles and  assured hemostasis. The rectus fascia was then closed with a running stitch of 0 Vicryl. We irrigated the subcutaneous fatty tissue and closed it with a 3-0 Monicryl. The skin was closed with a 4-0 Vicryl in a subcuticular fashion and surgical adhesive was placed over the incision.  Sponge lap and needle counts were correct.          Complications:   none   Grafts or Implants: NA   Disposition:   Mother to recovery room in stable condition          Smooth Smith DO  5/9/2018  10:21 AM

## 2018-05-09 NOTE — ANESTHESIA PROCEDURE NOTES
Spinal Block    Patient location during procedure: OB  Indication:at surgeon's request, post-op pain management and procedure for pain  Performed By  Anesthesiologist: DEVI GARZA  CRNA: JAMI FIGUEROA  Preanesthetic Checklist  Completed: patient identified, site marked, surgical consent, pre-op evaluation, timeout performed, IV checked, risks and benefits discussed and monitors and equipment checked  Spinal Block Prep:  Patient Position:sitting  Sterile Tech:cap, gloves, mask and sterile barriers  Prep:Betadine  Patient Monitoring:blood pressure monitoring, continuous pulse oximetry and EKG  Spinal Block Procedure  Approach:midline  Guidance:landmark technique and palpation technique  Location:L4-L5  Needle Type:Pencan  Needle Gauge:24 G  Placement of Spinal needle event:cerebrospinal fluid aspirated  Paresthesia: no  Fluid Appearance:clear  Post Assessment  Patient Tolerance:patient tolerated the procedure well with no apparent complications  Complications no  Additional Notes  L4/L5 positive CSF - lost flow.  L3/L4 positive CSF - neg para / hem, pt tolerated well.

## 2018-05-09 NOTE — PLAN OF CARE
Problem: Substance Use,  (Adult,Obstetrics,Pediatric)  Goal: Identify Related Risk Factors and Signs and Symptoms  Outcome: Ongoing (interventions implemented as appropriate)  Takes subutex   Goal: Integration of Healthy Physical/Emotional/Social Lifestyle  Outcome: Ongoing (interventions implemented as appropriate)

## 2018-05-09 NOTE — ANESTHESIA POSTPROCEDURE EVALUATION
Patient: Pamela Calero    Procedure Summary     Date:  18 Room / Location:   [facility] LABOR DELIVERY   [facility] DELIVERY    Anesthesia Start:  853 Anesthesia Stop:  941    Procedure:   SECTION REPEAT WITH TUBAL (Right Abdomen) Diagnosis:  (REPEAT  SECTION WITH BILATERAL TUBAL LIGATION, FILSHIE CLIPS)    Surgeon:  Smooth Smith DO Provider:  Aly Graham MD    Anesthesia Type:  general ASA Status:  3          Anesthesia Type: general  Last vitals  BP   123/72 (18 1140)   Temp   97.4 °F (36.3 °C) (18 1140)   Pulse   80 (18 1140)   Resp   16 (18 1140)     SpO2   100 % (18 1140)     Post Anesthesia Care and Evaluation    Patient location during evaluation: PACU  Patient participation: complete - patient participated  Level of consciousness: awake and alert  Pain score: 1  Pain management: adequate  Airway patency: patent  Anesthetic complications: No anesthetic complications  PONV Status: controlled  Cardiovascular status: acceptable  Respiratory status: acceptable  Hydration status: acceptable

## 2018-05-09 NOTE — PROGRESS NOTES
Case Management/Social Work    Patient Name:  Pamela Calero  YOB: 1990  MRN: [id number]  Admit Date:  5/9/2018      SS received consult for “MOTHER WITH HISTORY OF DRUG USE.  CURRENTLY ON BUPRENORPHINE.”  Pt is a 27 y/o who delivered viable baby girl 7 lbs and 5.02 ozs.  FOB is Ottoniel Calero and he is involved.     Pt’s UDS is positive for Buprenorphine and infant’s UDS and meconium is pending.  Infant is currently showing signs of withdrawal and is scoring a 7.  Pt is prescribed Buprenorphine 8 mg daily by Dr. Gross in [place], [place].  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  Pt states she has not used pain pills since she was 24 years old.      Pt has two other children: Do Calero age 3 and Patricia Calero age 4 and she states she has custody of both children.      Pt states she does not have a history with child protective services.  SS contacted [facility]  at [phone] and made a report regarding the above information.  Intake ID # [id number].  Report was not accepted as an investigation at this time.      Pt had 7 out of 13 prenatal visits.  Pt reports she had issues with transportation and also had to work on some occasions.      Pt used Passport insurance.  Pt reports she has infant care supplies and car seat available.      Pt will be transported home via private auto.            Electronically signed by:  Fernanda Zavala  05/09/18 2:43 PM

## 2018-05-10 LAB
BASOPHILS # BLD AUTO: 0.02 10*3/MM3 (ref 0–0.3)
BASOPHILS NFR BLD AUTO: 0.2 % (ref 0–2)
DEPRECATED RDW RBC AUTO: 41.9 FL (ref 37–54)
EOSINOPHIL # BLD AUTO: 0.07 10*3/MM3 (ref 0–0.7)
EOSINOPHIL NFR BLD AUTO: 0.7 % (ref 0–5)
ERYTHROCYTE [DISTWIDTH] IN BLOOD BY AUTOMATED COUNT: 13.8 % (ref 11.5–14.5)
HCT VFR BLD AUTO: 27.9 % (ref 37–47)
HGB BLD-MCNC: 9 G/DL (ref 12–16)
IMM GRANULOCYTES # BLD: 0.07 10*3/MM3 (ref 0–0.03)
IMM GRANULOCYTES NFR BLD: 0.7 % (ref 0–0.5)
LYMPHOCYTES # BLD AUTO: 1.64 10*3/MM3 (ref 1–3)
LYMPHOCYTES NFR BLD AUTO: 16.8 % (ref 21–51)
MCH RBC QN AUTO: 28.1 PG (ref 27–33)
MCHC RBC AUTO-ENTMCNC: 32.3 G/DL (ref 33–37)
MCV RBC AUTO: 87.2 FL (ref 80–94)
MONOCYTES # BLD AUTO: 0.88 10*3/MM3 (ref 0.1–0.9)
MONOCYTES NFR BLD AUTO: 9 % (ref 0–10)
NEUTROPHILS # BLD AUTO: 7.11 10*3/MM3 (ref 1.4–6.5)
NEUTROPHILS NFR BLD AUTO: 72.6 % (ref 30–70)
PLATELET # BLD AUTO: 139 10*3/MM3 (ref 130–400)
PMV BLD AUTO: 11.4 FL (ref 6–10)
RBC # BLD AUTO: 3.2 10*6/MM3 (ref 4.2–5.4)
WBC NRBC COR # BLD: 9.79 10*3/MM3 (ref 4.5–12.5)

## 2018-05-10 PROCEDURE — 94799 UNLISTED PULMONARY SVC/PX: CPT

## 2018-05-10 PROCEDURE — 85025 COMPLETE CBC W/AUTO DIFF WBC: CPT | Performed by: OBSTETRICS & GYNECOLOGY

## 2018-05-10 RX ADMIN — SIMETHICONE CHEW TAB 80 MG 80 MG: 80 TABLET ORAL at 05:03

## 2018-05-10 RX ADMIN — OXYCODONE HYDROCHLORIDE AND ACETAMINOPHEN 1 TABLET: 10; 325 TABLET ORAL at 15:16

## 2018-05-10 RX ADMIN — Medication 10 ML: at 12:03

## 2018-05-10 RX ADMIN — Medication 10 ML: at 21:15

## 2018-05-10 RX ADMIN — SIMETHICONE CHEW TAB 80 MG 80 MG: 80 TABLET ORAL at 18:22

## 2018-05-10 RX ADMIN — FERROUS SULFATE TAB 325 MG (65 MG ELEMENTAL FE) 325 MG: 325 (65 FE) TAB at 08:09

## 2018-05-10 RX ADMIN — IBUPROFEN 800 MG: 800 TABLET ORAL at 21:15

## 2018-05-10 RX ADMIN — IBUPROFEN 800 MG: 800 TABLET ORAL at 15:20

## 2018-05-10 RX ADMIN — SIMETHICONE CHEW TAB 80 MG 80 MG: 80 TABLET ORAL at 23:33

## 2018-05-10 RX ADMIN — FAMOTIDINE 20 MG: 20 TABLET, FILM COATED ORAL at 21:15

## 2018-05-10 RX ADMIN — IBUPROFEN 800 MG: 800 TABLET ORAL at 05:03

## 2018-05-10 RX ADMIN — OXYCODONE HYDROCHLORIDE AND ACETAMINOPHEN 1 TABLET: 10; 325 TABLET ORAL at 10:33

## 2018-05-10 RX ADMIN — BUPRENORPHINE HCL 12 MG: 2 TABLET SUBLINGUAL at 21:15

## 2018-05-10 RX ADMIN — OXYCODONE HYDROCHLORIDE AND ACETAMINOPHEN 1 TABLET: 10; 325 TABLET ORAL at 05:03

## 2018-05-10 RX ADMIN — PRENATAL VIT W/ FE FUMARATE-FA TAB 27-0.8 MG 1 TABLET: 27-0.8 TAB at 08:09

## 2018-05-10 RX ADMIN — FAMOTIDINE 20 MG: 20 TABLET, FILM COATED ORAL at 08:09

## 2018-05-10 RX ADMIN — DOCUSATE SODIUM 100 MG: 100 CAPSULE, LIQUID FILLED ORAL at 08:09

## 2018-05-10 NOTE — PLAN OF CARE
Problem: Patient Care Overview  Goal: Individualization and Mutuality  Outcome: Ongoing (interventions implemented as appropriate)    Goal: Discharge Needs Assessment  Outcome: Ongoing (interventions implemented as appropriate)      Problem: Postpartum ( Delivery) (Adult,Obstetrics,Pediatric)  Goal: Signs and Symptoms of Listed Potential Problems Will be Absent, Minimized or Managed (Postpartum)  Outcome: Ongoing (interventions implemented as appropriate)

## 2018-05-10 NOTE — PLAN OF CARE
Problem: Patient Care Overview  Goal: Discharge Needs Assessment  Outcome: Ongoing (interventions implemented as appropriate)      Problem: Postpartum ( Delivery) (Adult,Obstetrics,Pediatric)  Goal: Signs and Symptoms of Listed Potential Problems Will be Absent, Minimized or Managed (Postpartum)  Outcome: Ongoing (interventions implemented as appropriate)    Goal: Anesthesia/Sedation Recovery  Outcome: Outcome(s) achieved Date Met: 05/10/18      Problem: Substance Use,  (Adult,Obstetrics,Pediatric)  Goal: Integration of Healthy Physical/Emotional/Social Lifestyle  Outcome: Ongoing (interventions implemented as appropriate)

## 2018-05-10 NOTE — PROGRESS NOTES
ELIA Wilson   PROGRESS NOTE    Post-Op Day 1 S/P   Subjective   Subjective  Patient reports:  Pain is controlled with prescription NSAID's including ibuprofen (Motrin) and narcotic analgesics including oxycodone/acetaminophen (Percocet, Tylox).  She is up out of bed this am. Tolerating diet. Tolerating po -- normal. Voiding - without difficulty; flatus reported..  Vaginal bleeding is as much as expected.    Objective    Objective     Vitals: Vital Signs Range for the last 24 hours  Temperature: Temp:  [97.4 °F (36.3 °C)-98.2 °F (36.8 °C)] 97.9 °F (36.6 °C)   Temp Source: Temp src: Oral   BP: BP: (108-131)/() 110/65   Pulse: Heart Rate:  [63-87] 68   Respirations: Resp:  [9-22] 18   Weight:              Physical Exam    Lungs clear to auscultation bilaterally   Abdomen Soft, ND, tender along incision. + BS   Incision  healing well, no drainage   Extremities extremities normal, atraumatic, no cyanosis or edema         [unfilled]       Lab Results   Component Value Date    ABO A 2018    RH Positive 2018        Lab Results   Component Value Date    HGB 9.0 (L) 05/10/2018    HCT 27.9 (L) 05/10/2018       Assessment/Plan      Active Problems:    Pregnancy    Previous  section    Assessment & Plan    Assessment:    Pamela VANE Lamey is Day 1  post-partum  , Low Transverse    .      Plan:  continue post op care.      CHILANGO Alanis  05/10/18  9:01 AM

## 2018-05-11 VITALS
OXYGEN SATURATION: 98 % | TEMPERATURE: 97.6 F | DIASTOLIC BLOOD PRESSURE: 47 MMHG | HEART RATE: 75 BPM | RESPIRATION RATE: 18 BRPM | WEIGHT: 168.5 LBS | BODY MASS INDEX: 29.86 KG/M2 | HEIGHT: 63 IN | SYSTOLIC BLOOD PRESSURE: 93 MMHG

## 2018-05-11 LAB
LAB AP CASE REPORT: NORMAL
LAB AP CLINICAL INFORMATION: NORMAL
Lab: NORMAL
PATH REPORT.FINAL DX SPEC: NORMAL

## 2018-05-11 RX ORDER — OXYCODONE HYDROCHLORIDE AND ACETAMINOPHEN 5; 325 MG/1; MG/1
1 TABLET ORAL EVERY 4 HOURS PRN
Qty: 20 TABLET | Refills: 0 | Status: SHIPPED | OUTPATIENT
Start: 2018-05-11 | End: 2018-05-19

## 2018-05-11 RX ORDER — PSEUDOEPHEDRINE HCL 30 MG
100 TABLET ORAL DAILY
Qty: 30 CAPSULE | Refills: 0 | Status: SHIPPED | OUTPATIENT
Start: 2018-05-12

## 2018-05-11 RX ORDER — IBUPROFEN 600 MG/1
600 TABLET ORAL 3 TIMES DAILY
Qty: 40 TABLET | Refills: 1 | Status: SHIPPED | OUTPATIENT
Start: 2018-05-11

## 2018-05-11 RX ADMIN — DOCUSATE SODIUM 100 MG: 100 CAPSULE, LIQUID FILLED ORAL at 08:31

## 2018-05-11 RX ADMIN — IBUPROFEN 800 MG: 800 TABLET ORAL at 12:32

## 2018-05-11 RX ADMIN — OXYCODONE HYDROCHLORIDE AND ACETAMINOPHEN 1 TABLET: 10; 325 TABLET ORAL at 08:36

## 2018-05-11 RX ADMIN — PRENATAL VIT W/ FE FUMARATE-FA TAB 27-0.8 MG 1 TABLET: 27-0.8 TAB at 08:31

## 2018-05-11 RX ADMIN — SIMETHICONE CHEW TAB 80 MG 80 MG: 80 TABLET ORAL at 05:18

## 2018-05-11 RX ADMIN — FERROUS SULFATE TAB 325 MG (65 MG ELEMENTAL FE) 325 MG: 325 (65 FE) TAB at 08:31

## 2018-05-11 RX ADMIN — IBUPROFEN 800 MG: 800 TABLET ORAL at 05:19

## 2018-05-11 RX ADMIN — OXYCODONE HYDROCHLORIDE AND ACETAMINOPHEN 1 TABLET: 10; 325 TABLET ORAL at 03:15

## 2018-05-11 RX ADMIN — OXYCODONE HYDROCHLORIDE AND ACETAMINOPHEN 1 TABLET: 10; 325 TABLET ORAL at 12:32

## 2018-05-11 NOTE — PAYOR COMM NOTE
"CONTACT:  THUY ALVARENGA RN, BSN  UTILIZATION MANAGEMENT DEPT.  [facility]  [address] [facility], [address]  PHONE:  [phone]  FAX: [phone]    PATIENT DISCHARGED HOME 18. REFER TO AUTH # [id number]    Logan Calero (28 y.o. Female)     Date of Birth Social Security Number Address Home Phone MRN    1990  [address] [address] [phone] [id number]    Hoahaoism Marital Status          None        Admission Date Admission Type Admitting Provider Attending Provider Department, Room/Bed    18 Elective Smooth Smith DO  [facility], W243    Discharge Date Discharge Disposition Discharge Destination        2018 Home or Self Care              Attending Provider:  (none)   Allergies:  No Known Allergies    Isolation:  None   Infection:  None   Code Status:  FULL    Ht:  160 cm (63\")   Wt:  76.4 kg (168 lb 8 oz)    Admission Cmt:  None   Principal Problem:  None                Active Insurance as of 2018     Primary Coverage     Payor Plan Insurance Group Employer/Plan Group    PASSPORT PASSPORT MEDICAID     Payor Plan Address Payor Plan Phone Number Effective From Effective To    [address] [phone] [address]     [place] [address]       Subscriber Name Subscriber Birth Date Member ID       LOGAN CALERO 1990 [id number]                 Emergency Contacts      (Rel.) Home Phone Work Phone Mobile Phone    AbdiasOttoniel de la rosa (Spouse) [phone] -- --               Discharge Summary      Lynette Serrano, APRN at 2018  8:19 AM          [facility]  Delivery Discharge Summary    Primary OB Clinician:     EDC: Estimated Date of Delivery: 5/15/18    Gestational Age:39w1d    Antepartum complications: none    Date of Delivery: 2018   Time of Delivery: 9:15 AM     Delivered By:  Smooth Smith     Delivery Type: , Low Transverse  with BTL    Tubal Ligation: " BTL    Baby:Female infant;   Apgar:  9   @ 1 minute /   Apgar:  9   @ 5 minutes   Weight: 7lb 0.5oz     Anesthesia: Spinal      Intrapartum complications: None    Laceration: No    Episiotomy: No    Placenta: Manual removal     Feeding method: Breastfeeding Status: Unknown    [unfilled]       Lab Results   Component Value Date    ABO A 2018    RH Positive 2018        Lab Results   Component Value Date    HGB 9.0 (L) 05/10/2018    HCT 27.9 (L) 05/10/2018       Rh Immune globulin given: not applicable      Discharge Date: 2018; Discharge Time: 8:19 AM        Plan:    Address and phone number verified and same.  Follow-up appointment with AMB in 3 weeks.      CHILANGO Alanis  2018  8:19 AM    Electronically signed by CHILANGO Alanis at 2018  8:20 AM

## 2018-05-11 NOTE — PROGRESS NOTES
" Steve   PROGRESS NOTE    Post-Op Day 2 S/P   Subjective   Subjective  Patient reports:  Pain is controlled with prescription NSAID's including ibuprofen (Motrin) and narcotic analgesics including oxycodone/acetaminophen (Percocet, Tylox).  She is up out of bed this am. Tolerating diet. Tolerating po -- normal. Voiding - without difficulty; flatus reported..  Vaginal bleeding is as much as expected.    Objective    Objective:  Vital signs (most recent): Blood pressure 117/61, pulse 80, temperature 98.3 °F (36.8 °C), temperature source Oral, resp. rate 18, height 160 cm (63\"), weight 76.4 kg (168 lb 8 oz), SpO2 98 %, unknown if currently breastfeeding.       Vitals: Vital Signs Range for the last 24 hours  Temperature: Temp:  [98.3 °F (36.8 °C)] 98.3 °F (36.8 °C)   Temp Source: Temp src: Oral   BP: BP: (117)/(61) 117/61   Pulse: Heart Rate:  [80] 80   Respirations: Resp:  [18] 18   Weight:              Physical Exam    Lungs clear to auscultation bilaterally   Abdomen Soft, ND, tender along incision. + BS   Incision  healing well, no drainage   Extremities extremities normal, atraumatic, no cyanosis or edema         [unfilled]       Lab Results   Component Value Date    ABO A 2018    RH Positive 2018        Lab Results   Component Value Date    HGB 9.0 (L) 05/10/2018    HCT 27.9 (L) 05/10/2018       Assessment/Plan      Active Problems:    Pregnancy    Previous  section    Assessment & Plan    Assessment:    Pamela Calero is Day 2  post-partum  , Low Transverse    .      Plan:  plan for discharge today.      Lynette Serrano, APRN  18  8:17 AM  "

## 2018-05-11 NOTE — PLAN OF CARE
Problem: Patient Care Overview  Goal: Plan of Care Review  Outcome: Ongoing (interventions implemented as appropriate)   18 0510   Coping/Psychosocial   Plan of Care Reviewed With patient   Plan of Care Review   Progress improving     Goal: Individualization and Mutuality  Outcome: Ongoing (interventions implemented as appropriate)    Goal: Discharge Needs Assessment  Outcome: Ongoing (interventions implemented as appropriate)      Problem: Postpartum ( Delivery) (Adult,Obstetrics,Pediatric)  Goal: Signs and Symptoms of Listed Potential Problems Will be Absent, Minimized or Managed (Postpartum)  Outcome: Ongoing (interventions implemented as appropriate)      Problem: Substance Use,  (Adult,Obstetrics,Pediatric)  Goal: Identify Related Risk Factors and Signs and Symptoms  Outcome: Ongoing (interventions implemented as appropriate)    Goal: Integration of Healthy Physical/Emotional/Social Lifestyle  Outcome: Ongoing (interventions implemented as appropriate)

## 2018-05-11 NOTE — DISCHARGE SUMMARY
ELIA Wilson  Delivery Discharge Summary    Primary OB Clinician:     EDC: Estimated Date of Delivery: 5/15/18    Gestational Age:39w1d    Antepartum complications: none    Date of Delivery: 2018   Time of Delivery: 9:15 AM     Delivered By:  Smooth Smith     Delivery Type: , Low Transverse  with BTL    Tubal Ligation: BTL    Baby:Female infant;   Apgar:  9   @ 1 minute /   Apgar:  9   @ 5 minutes   Weight: 7lb 0.5oz     Anesthesia: Spinal      Intrapartum complications: None    Laceration: No    Episiotomy: No    Placenta: Manual removal     Feeding method: Breastfeeding Status: Unknown    [unfilled]       Lab Results   Component Value Date    ABO A 2018    RH Positive 2018        Lab Results   Component Value Date    HGB 9.0 (L) 05/10/2018    HCT 27.9 (L) 05/10/2018       Rh Immune globulin given: not applicable      Discharge Date: 2018; Discharge Time: 8:19 AM        Plan:    Address and phone number verified and same.  Follow-up appointment with AMB in 3 weeks.      Lynette Serrano, APRN  2018  8:19 AM

## 2018-05-16 NOTE — H&P
Patient Care Team:  No Known Provider as PCP - General  No Known Provider as PCP - Family Medicine    Chief complaint here for repeat  and permanent sterilization    Subjective     Patient is a 28 y.o. female  5 para 2 at 39 weeks who presents for repeat  section and permanent sterilization.  She has had an uncomplicated pregnancy.  We discussed the risks and benefits the procedure in detail.  We discussed the risk of failure of the sterilization as well.    Review of Systems   Pertinent items are noted in HPI    History  Past Medical History:   Diagnosis Date   • Liver disease    • Urinary tract infection      Past Surgical History:   Procedure Laterality Date   •  SECTION WITH TUBAL Right 2018    Procedure:  SECTION REPEAT WITH TUBAL;  Surgeon: Smooth Smith DO;  Location: [facility] LABOR DELIVERY;  Service: Obstetrics/Gynecology   • OOPHORECTOMY Left      Family History   Problem Relation Age of Onset   • Depression Father    • Hypertension Father    • Multiple sclerosis Father    • Seizures Father    • Depression Mother    • Hypertension Mother    • Diabetes Paternal Grandfather    • Lung cancer Paternal Grandmother    • Alcohol abuse Maternal Grandmother    • Depression Maternal Grandmother    • Diabetes Maternal Grandmother    • Hypertension Maternal Grandmother    • Alcohol abuse Maternal Grandfather    • Coronary artery disease Maternal Grandfather      Social History   Substance Use Topics   • Smoking status: Never Smoker   • Smokeless tobacco: Never Used   • Alcohol use No     No prescriptions prior to admission.     Allergies:  Patient has no known allergies.    Objective     Vital Signs       Physical Exam:      General Appearance:    Alert, cooperative, in no acute distress   Head:    Normocephalic, without obvious abnormality, atraumatic   Eyes:            Lids and lashes normal, conjunctivae and sclerae normal, no   icterus, no pallor, corneas  clear, PERRLA   Ears:    Ears appear intact with no abnormalities noted   Throat:   No oral lesions, no thrush, oral mucosa moist   Neck:   No adenopathy, supple, trachea midline, no thyromegaly, no     carotid bruit, no JVD   Back:     No kyphosis present, no scoliosis present, no skin lesions,       erythema or scars, no tenderness to percussion or                   palpation,   range of motion normal   Lungs:     Clear to auscultation,respirations regular, even and                   unlabored    Heart:    Regular rhythm and normal rate, normal S1 and S2, no            murmur, no gallop, no rub, no click   Breast Exam:    Deferred   Abdomen:     Normal bowel sounds, no masses, no organomegaly, soft        non-tender, non-distended, no guarding, no rebound                 tenderness   Genitalia:    Deferred   Extremities:   Moves all extremities well, no edema, no cyanosis, no              redness   Pulses:   Pulses palpable and equal bilaterally   Skin:   No bleeding, bruising or rash   Lymph nodes:   No palpable adenopathy   Neurologic:   Cranial nerves 2 - 12 grossly intact, sensation intact, DTR        present and equal bilaterally       Results Review:    I reviewed the patient's new clinical results.    Assessment/Plan     Active Problems:    Pregnancy    Previous  section  Plan repeat  section with bilateral tubal ligation.    I discussed the patients findings and my recommendations with patient.     Smooth Smith DO  18  12:48 PM

## (undated) DEVICE — HYDROGEL COATED LATEX URINE METER FOLEY TRAY,16 FR/CH (5.3 MM), 5 ML CATHETER PRE-CONNECTED TO 2000 ML DRAINAGE BAG WITH NEEDLE SAMPLING: Brand: DOVER

## (undated) DEVICE — BG RESUSCITATOR INFANT BABYBLUE2

## (undated) DEVICE — PK C/SECT 70

## (undated) DEVICE — CATH IV ANGIOCATH/AUTOGARD 18G 1.75IN GRN

## (undated) DEVICE — ADHS SKIN DERMABOND TOPICAL HI VISC